# Patient Record
Sex: FEMALE | Race: WHITE | NOT HISPANIC OR LATINO | URBAN - METROPOLITAN AREA
[De-identification: names, ages, dates, MRNs, and addresses within clinical notes are randomized per-mention and may not be internally consistent; named-entity substitution may affect disease eponyms.]

---

## 2024-01-28 ENCOUNTER — ON CAMPUS - OUTPATIENT (OUTPATIENT)
Dept: URBAN - METROPOLITAN AREA HOSPITAL 97 | Facility: HOSPITAL | Age: 39
End: 2024-01-28

## 2024-01-28 DIAGNOSIS — K92.1 MELENA: ICD-10-CM

## 2024-01-28 DIAGNOSIS — R13.10 DYSPHAGIA, UNSPECIFIED: ICD-10-CM

## 2024-01-28 PROCEDURE — 99204 OFFICE O/P NEW MOD 45 MIN: CPT | Performed by: INTERNAL MEDICINE

## 2024-01-29 ENCOUNTER — INPATIENT HOSPITAL (OUTPATIENT)
Dept: URBAN - METROPOLITAN AREA HOSPITAL 97 | Facility: HOSPITAL | Age: 39
End: 2024-01-29

## 2024-01-29 DIAGNOSIS — R13.10 DYSPHAGIA, UNSPECIFIED: ICD-10-CM

## 2024-01-29 PROCEDURE — 43239 EGD BIOPSY SINGLE/MULTIPLE: CPT | Performed by: INTERNAL MEDICINE

## 2024-01-30 ENCOUNTER — ON CAMPUS - OUTPATIENT (OUTPATIENT)
Dept: URBAN - METROPOLITAN AREA HOSPITAL 97 | Facility: HOSPITAL | Age: 39
End: 2024-01-30

## 2024-01-30 DIAGNOSIS — R13.10 DYSPHAGIA, UNSPECIFIED: ICD-10-CM

## 2024-01-30 DIAGNOSIS — R07.89 OTHER CHEST PAIN: ICD-10-CM

## 2024-01-30 PROCEDURE — 99214 OFFICE O/P EST MOD 30 MIN: CPT | Performed by: INTERNAL MEDICINE

## 2024-01-31 ENCOUNTER — ON CAMPUS - OUTPATIENT (OUTPATIENT)
Dept: URBAN - METROPOLITAN AREA HOSPITAL 97 | Facility: HOSPITAL | Age: 39
End: 2024-01-31

## 2024-01-31 DIAGNOSIS — R07.89 OTHER CHEST PAIN: ICD-10-CM

## 2024-01-31 DIAGNOSIS — R13.19 OTHER DYSPHAGIA: ICD-10-CM

## 2024-01-31 PROCEDURE — 99213 OFFICE O/P EST LOW 20 MIN: CPT | Performed by: INTERNAL MEDICINE

## 2024-02-01 ENCOUNTER — ON CAMPUS - OUTPATIENT (OUTPATIENT)
Dept: URBAN - METROPOLITAN AREA HOSPITAL 97 | Facility: HOSPITAL | Age: 39
End: 2024-02-01

## 2024-02-01 DIAGNOSIS — K52.81 EOSINOPHILIC GASTRITIS OR GASTROENTERITIS: ICD-10-CM

## 2024-02-01 DIAGNOSIS — R13.19 OTHER DYSPHAGIA: ICD-10-CM

## 2024-02-01 PROCEDURE — 99214 OFFICE O/P EST MOD 30 MIN: CPT | Performed by: INTERNAL MEDICINE

## 2024-02-15 ENCOUNTER — OFFICE (OUTPATIENT)
Dept: URBAN - METROPOLITAN AREA CLINIC 11 | Facility: CLINIC | Age: 39
End: 2024-02-15

## 2024-02-15 VITALS
HEART RATE: 82 BPM | HEIGHT: 63 IN | OXYGEN SATURATION: 98 % | WEIGHT: 136 LBS | SYSTOLIC BLOOD PRESSURE: 133 MMHG | DIASTOLIC BLOOD PRESSURE: 91 MMHG

## 2024-02-15 DIAGNOSIS — R13.10 DYSPHAGIA, UNSPECIFIED: ICD-10-CM

## 2024-02-15 DIAGNOSIS — F41.9 ANXIETY DISORDER, UNSPECIFIED: ICD-10-CM

## 2024-02-15 DIAGNOSIS — R07.9 CHEST PAIN, UNSPECIFIED: ICD-10-CM

## 2024-02-15 PROCEDURE — 99204 OFFICE O/P NEW MOD 45 MIN: CPT | Performed by: STUDENT IN AN ORGANIZED HEALTH CARE EDUCATION/TRAINING PROGRAM

## 2024-02-15 RX ORDER — HYOSCYAMINE SULFATE 0.12 MG/1
TABLET ORAL; SUBLINGUAL
Qty: 90 | Refills: 3 | Status: ACTIVE
Start: 2024-02-15

## 2024-02-15 NOTE — SERVICENOTES
patient has had a large workup for her dysphagia over the past 3 years as well as her chest pains.  She carries this diagnosis of eosinophilic esophagitis but after I reviewed the [facility] by [facility] records the biopsies did not sound conclusive for EOE and the fact that she has not responded to swallowed steroids and maximum dose PPI suggest that this is not an inflammatory problem.  Her recent EGD biopsies were negative for EOE as well so I encouraged her to reduce her PPI down to once a day and stopped the oral steroid.  I am going to check esophageal manometry to rule out a physiologic cause of her dysphagia and I am going to start her on scheduled hyoscyamine 3 times a day for smooth muscle relaxation of the esophagus in case there is a component of spasm.  If she has not any better in 4 weeks then I am going to start her on low-dose amitriptyline or consider another smooth muscle relaxants such as nifedipine or isosorbide.  I encouraged the patient to adhere to a liquid diet to get anything down she can and consider talking to her primary care doctor about something more stable to use for anxiety rather than p.r.n. Xanax.  I personally reviewed her extensive medical records in [facility] for her visit today.

## 2024-02-15 NOTE — SERVICEHPINOTES
Ms. Era Ceballos is a 37 yo female with  past medical history of factor 5 Leiden, tobacco abuse, multiple abdominal surgeries, who presents to [place] [id number] for hospital follow-up for dysphagia and noncardiac chest pain.
kassi
[facility] visit 02/15/2024: 
kassi The patient was recently hospitalized for chest pain and dysphagia.  She was seen by Dr. De La Paz at [facility] who performed an upper endoscopy on her.  Duodenal, gastric, and esophagus biopsies were negative for any significant pathology.  She was not dilated and there was no strictures or furrows that were definitely seen.  She was put on PPI b.i.d. and oral fluticasone which she has continued since the hospitalization last month but she reports absolutely no improvement in her symptoms on maximum dose PPI and steroids.  She reports to me that she was previously seen at [facility] by Dr. Padilla in 2021 originally when she developed dysphagia symptoms.  She went underwent an upper GI series which was normal, EGD with empiric dilation to 53 Greek and esophageal biopsies that showed mild increase in eosinophils but were not diagnostic for EOE.  Since she did not respond to PPI therapy alone she was increased to oral steroids at that time but still did not have any improvement.  She saw an allergist and was told that she is allergic to wheat, soy, dairy, shellfish, and cashews.  She had a colonoscopy in 2021 as well which was normal.  No family history of GI malignancies or liver disease.  She tells me that she does not have any anxiety but she also takes Xanax a few times a week to "help her sleep".  This is prescribed by her primary care doctor.  She smokes cigarettes.  She previous abdominal surgeries include 3 C sections and a total hysterectomy.  She does not use any marijuana or alcohol.  She was taking Advil 3 pills per day for several months until her dysphagia symptoms were worsening.  She reports now that she is having persistent vomiting and barely able to keep down any liquids, even Gatorade.  And her stools are mucousy.